# Patient Record
Sex: MALE | Race: OTHER | URBAN - METROPOLITAN AREA
[De-identification: names, ages, dates, MRNs, and addresses within clinical notes are randomized per-mention and may not be internally consistent; named-entity substitution may affect disease eponyms.]

---

## 2018-08-14 NOTE — PATIENT DISCUSSION
"""Continue Warm compresses both eyes twice a day. "" ""Continue Baby shampoo both eyes twice a day. "" ""Continue Artificial tears both eyes two - four times a day.  Systane Balance or Refresh Advanced"" ""Start Bacitracin ointment both eyes at bedtime. """

## 2018-09-04 NOTE — PATIENT DISCUSSION
"""New chalazion RUMARCOS.  Continue warm compresses and bacitracin BID and [place] mask BID """

## 2023-01-26 ENCOUNTER — CONSULTATION/EVALUATION (OUTPATIENT)
Facility: LOCATION | Age: 83
End: 2023-01-26

## 2023-01-26 VITALS — BODY MASS INDEX: 22.82 KG/M2 | WEIGHT: 137 LBS | HEIGHT: 65 IN

## 2023-01-26 DIAGNOSIS — H25.813: ICD-10-CM

## 2023-01-26 PROCEDURE — 92136 OPHTHALMIC BIOMETRY: CPT

## 2023-01-26 PROCEDURE — 99204 OFFICE O/P NEW MOD 45 MIN: CPT

## 2023-01-26 PROCEDURE — 92134 CPTRZ OPH DX IMG PST SGM RTA: CPT

## 2023-01-26 ASSESSMENT — VISUAL ACUITY
OS_CC: 20/70
OD_SC: 20/30-1
OU_SC: 20/30-2
OU_CC: J1-1
OS_SC: 20/50+2
OU_SC: J7
OU_CC: 20/70
OD_CC: J1
OS_SC: J7
OD_CC: 20/60
OD_SC: J7
OS_CC: J2
OS_PH: 20/50-2

## 2023-01-26 ASSESSMENT — TONOMETRY
OD_IOP_MMHG: 13
OS_IOP_MMHG: 12

## 2023-04-20 ENCOUNTER — SURGERY/PROCEDURE (OUTPATIENT)
Facility: LOCATION | Age: 83
End: 2023-04-20

## 2023-04-20 DIAGNOSIS — H25.811: ICD-10-CM

## 2023-04-20 PROCEDURE — 6698454 REMOVE CATARACT;INSERT LENS (SX ONLY)

## 2023-04-21 ENCOUNTER — POST-OP (OUTPATIENT)
Facility: LOCATION | Age: 83
End: 2023-04-21

## 2023-04-21 DIAGNOSIS — Z96.1: ICD-10-CM

## 2023-04-21 PROCEDURE — 99024 POSTOP FOLLOW-UP VISIT: CPT

## 2023-04-21 ASSESSMENT — TONOMETRY
OS_IOP_MMHG: 8
OD_IOP_MMHG: 9

## 2023-04-21 ASSESSMENT — VISUAL ACUITY
OD_SC: 20/60
OS_SC: 20/30